# Patient Record
Sex: FEMALE | Race: BLACK OR AFRICAN AMERICAN | NOT HISPANIC OR LATINO
[De-identification: names, ages, dates, MRNs, and addresses within clinical notes are randomized per-mention and may not be internally consistent; named-entity substitution may affect disease eponyms.]

---

## 2017-03-28 PROBLEM — Z00.00 ENCOUNTER FOR PREVENTIVE HEALTH EXAMINATION: Status: ACTIVE | Noted: 2017-03-28

## 2017-04-12 ENCOUNTER — APPOINTMENT (OUTPATIENT)
Dept: OPHTHALMOLOGY | Facility: CLINIC | Age: 52
End: 2017-04-12

## 2017-04-12 DIAGNOSIS — H40.033 ANATOMICAL NARROW ANGLE, BILATERAL: ICD-10-CM

## 2020-01-02 ENCOUNTER — INPATIENT (INPATIENT)
Facility: HOSPITAL | Age: 55
LOS: 0 days | Discharge: HOME | End: 2020-01-03
Attending: SPECIALIST | Admitting: SPECIALIST
Payer: MEDICAID

## 2020-01-02 VITALS
SYSTOLIC BLOOD PRESSURE: 146 MMHG | RESPIRATION RATE: 16 BRPM | OXYGEN SATURATION: 100 % | TEMPERATURE: 98 F | DIASTOLIC BLOOD PRESSURE: 89 MMHG | HEART RATE: 86 BPM

## 2020-01-02 DIAGNOSIS — K56.609 UNSPECIFIED INTESTINAL OBSTRUCTION, UNSPECIFIED AS TO PARTIAL VERSUS COMPLETE OBSTRUCTION: ICD-10-CM

## 2020-01-02 LAB
ALBUMIN SERPL ELPH-MCNC: 4.3 G/DL — SIGNIFICANT CHANGE UP (ref 3.3–5)
ALP SERPL-CCNC: 122 U/L — HIGH (ref 40–120)
ALT FLD-CCNC: 14 U/L — SIGNIFICANT CHANGE UP (ref 4–33)
ANION GAP SERPL CALC-SCNC: 13 MMO/L — SIGNIFICANT CHANGE UP (ref 7–14)
AST SERPL-CCNC: 18 U/L — SIGNIFICANT CHANGE UP (ref 4–32)
BASE EXCESS BLDV CALC-SCNC: 2.6 MMOL/L — SIGNIFICANT CHANGE UP
BILIRUB SERPL-MCNC: 0.3 MG/DL — SIGNIFICANT CHANGE UP (ref 0.2–1.2)
BLOOD GAS VENOUS - CREATININE: 0.7 MG/DL — SIGNIFICANT CHANGE UP (ref 0.5–1.3)
BLOOD GAS VENOUS - FIO2: 21 — SIGNIFICANT CHANGE UP
BUN SERPL-MCNC: 15 MG/DL — SIGNIFICANT CHANGE UP (ref 7–23)
CALCIUM SERPL-MCNC: 9.1 MG/DL — SIGNIFICANT CHANGE UP (ref 8.4–10.5)
CHLORIDE BLDV-SCNC: 108 MMOL/L — SIGNIFICANT CHANGE UP (ref 96–108)
CHLORIDE SERPL-SCNC: 99 MMOL/L — SIGNIFICANT CHANGE UP (ref 98–107)
CO2 SERPL-SCNC: 23 MMOL/L — SIGNIFICANT CHANGE UP (ref 22–31)
CREAT SERPL-MCNC: 0.67 MG/DL — SIGNIFICANT CHANGE UP (ref 0.5–1.3)
GAS PNL BLDV: 139 MMOL/L — SIGNIFICANT CHANGE UP (ref 136–146)
GLUCOSE BLDV-MCNC: 103 MG/DL — HIGH (ref 70–99)
GLUCOSE SERPL-MCNC: 126 MG/DL — HIGH (ref 70–99)
HCO3 BLDV-SCNC: 25 MMOL/L — SIGNIFICANT CHANGE UP (ref 20–27)
HCT VFR BLD CALC: 43.6 % — SIGNIFICANT CHANGE UP (ref 34.5–45)
HCT VFR BLDV CALC: 43.9 % — SIGNIFICANT CHANGE UP (ref 34.5–45)
HGB BLD-MCNC: 14 G/DL — SIGNIFICANT CHANGE UP (ref 11.5–15.5)
HGB BLDV-MCNC: 14.3 G/DL — SIGNIFICANT CHANGE UP (ref 11.5–15.5)
LACTATE BLDV-MCNC: 1.1 MMOL/L — SIGNIFICANT CHANGE UP (ref 0.5–2)
MCHC RBC-ENTMCNC: 28 PG — SIGNIFICANT CHANGE UP (ref 27–34)
MCHC RBC-ENTMCNC: 32.1 % — SIGNIFICANT CHANGE UP (ref 32–36)
MCV RBC AUTO: 87.2 FL — SIGNIFICANT CHANGE UP (ref 80–100)
NRBC # FLD: 0 K/UL — SIGNIFICANT CHANGE UP (ref 0–0)
PCO2 BLDV: 47 MMHG — SIGNIFICANT CHANGE UP (ref 41–51)
PH BLDV: 7.38 PH — SIGNIFICANT CHANGE UP (ref 7.32–7.43)
PLATELET # BLD AUTO: 334 K/UL — SIGNIFICANT CHANGE UP (ref 150–400)
PMV BLD: 7.9 FL — SIGNIFICANT CHANGE UP (ref 7–13)
PO2 BLDV: 27 MMHG — LOW (ref 35–40)
POTASSIUM BLDV-SCNC: 3.7 MMOL/L — SIGNIFICANT CHANGE UP (ref 3.4–4.5)
POTASSIUM SERPL-MCNC: 3.8 MMOL/L — SIGNIFICANT CHANGE UP (ref 3.5–5.3)
POTASSIUM SERPL-SCNC: 3.8 MMOL/L — SIGNIFICANT CHANGE UP (ref 3.5–5.3)
PROT SERPL-MCNC: 7.9 G/DL — SIGNIFICANT CHANGE UP (ref 6–8.3)
RBC # BLD: 5 M/UL — SIGNIFICANT CHANGE UP (ref 3.8–5.2)
RBC # FLD: 12.6 % — SIGNIFICANT CHANGE UP (ref 10.3–14.5)
SAO2 % BLDV: 45.2 % — LOW (ref 60–85)
SODIUM SERPL-SCNC: 135 MMOL/L — SIGNIFICANT CHANGE UP (ref 135–145)
WBC # BLD: 13.12 K/UL — HIGH (ref 3.8–10.5)
WBC # FLD AUTO: 13.12 K/UL — HIGH (ref 3.8–10.5)

## 2020-01-02 PROCEDURE — 74177 CT ABD & PELVIS W/CONTRAST: CPT | Mod: 26

## 2020-01-02 PROCEDURE — 76705 ECHO EXAM OF ABDOMEN: CPT | Mod: 26

## 2020-01-02 RX ORDER — MORPHINE SULFATE 50 MG/1
4 CAPSULE, EXTENDED RELEASE ORAL ONCE
Refills: 0 | Status: DISCONTINUED | OUTPATIENT
Start: 2020-01-02 | End: 2020-01-02

## 2020-01-02 RX ORDER — ONDANSETRON 8 MG/1
4 TABLET, FILM COATED ORAL ONCE
Refills: 0 | Status: COMPLETED | OUTPATIENT
Start: 2020-01-02 | End: 2020-01-02

## 2020-01-02 RX ORDER — ENOXAPARIN SODIUM 100 MG/ML
40 INJECTION SUBCUTANEOUS ONCE
Refills: 0 | Status: COMPLETED | OUTPATIENT
Start: 2020-01-02 | End: 2020-01-02

## 2020-01-02 RX ORDER — SODIUM CHLORIDE 9 MG/ML
1000 INJECTION, SOLUTION INTRAVENOUS
Refills: 0 | Status: DISCONTINUED | OUTPATIENT
Start: 2020-01-02 | End: 2020-01-03

## 2020-01-02 RX ORDER — SODIUM CHLORIDE 9 MG/ML
1000 INJECTION INTRAMUSCULAR; INTRAVENOUS; SUBCUTANEOUS ONCE
Refills: 0 | Status: COMPLETED | OUTPATIENT
Start: 2020-01-02 | End: 2020-01-02

## 2020-01-02 RX ORDER — ENOXAPARIN SODIUM 100 MG/ML
40 INJECTION SUBCUTANEOUS EVERY 24 HOURS
Refills: 0 | Status: DISCONTINUED | OUTPATIENT
Start: 2020-01-02 | End: 2020-01-03

## 2020-01-02 RX ADMIN — SODIUM CHLORIDE 1000 MILLILITER(S): 9 INJECTION INTRAMUSCULAR; INTRAVENOUS; SUBCUTANEOUS at 09:43

## 2020-01-02 RX ADMIN — MORPHINE SULFATE 4 MILLIGRAM(S): 50 CAPSULE, EXTENDED RELEASE ORAL at 10:00

## 2020-01-02 RX ADMIN — SODIUM CHLORIDE 100 MILLILITER(S): 9 INJECTION, SOLUTION INTRAVENOUS at 18:41

## 2020-01-02 RX ADMIN — ONDANSETRON 4 MILLIGRAM(S): 8 TABLET, FILM COATED ORAL at 08:36

## 2020-01-02 RX ADMIN — ENOXAPARIN SODIUM 40 MILLIGRAM(S): 100 INJECTION SUBCUTANEOUS at 18:41

## 2020-01-02 NOTE — H&P ADULT - NSHPPHYSICALEXAM_GEN_ALL_CORE
Physical Exam  T(C): 36.7  HR: 67 (67 - 86)  BP: 137/77 (137/77 - 158/95)  RR: 18 (16 - 18)  SpO2: 100% (100% - 100%)  Tmax: T(C): , Max: 36.9 (01-02-20 @ 07:13)    General: well developed, well nourished, NAD  Neuro: alert and oriented x4  Respiratory: airway patent, respirations nonlabored on RA  CVS: regular rate and rhythm  Abdomen: soft, nontender, nondistended, prior infraumbilical incision is well healed, prior Pfannenstiel incision is well-healed  Extremities: no edema, sensation and movement grossly intact  Skin: warm, dry, appropriate color

## 2020-01-02 NOTE — ED PROVIDER NOTE - NS ED ROS FT
Gen: + fever, denies weight loss  CV: Denies chest pain, palpitations  HEENT: Denies neck pain, sore throat, eye discharge  Skin: Denies rash, erythema, color changes  Resp: Denies SOB, cough  Endo: Denies sensitivity to heat, cold, increased urination  GI: Denies constipation, + nausea, + vomiting  Msk: Denies back pain, LE swelling, extremity pain  : Denies dysuria, increased frequency  Neuro: Denies LOC, weakness, seizures  Psych: Denies hx of psych, hallucinations, SI

## 2020-01-02 NOTE — ED PROVIDER NOTE - CLINICAL SUMMARY MEDICAL DECISION MAKING FREE TEXT BOX
Joseph Frankel PGY1: 53 yo with no pmh presenting for abdominal pain and n/v. VSS. Patient looks well and is non toxic appearing. PE as above. Concern for cholecystitis vs appendicitis. Also could be viral gastroenteritis. Will get basic labs, RUQ US, CT AandP, give fluids and zofran. Will reassess. Joseph Frankel PGY1: 55 yo with no pmh presenting for abdominal pain and n/v. VSS. Patient looks well and is non toxic appearing. PE as above. Concern for cholecystitis vs appendicitis vs sbo. Also could be viral gastroenteritis. Will get basic labs, RUQ US, CT AandP, give fluids and zofran. Will reassess.

## 2020-01-02 NOTE — ED PROVIDER NOTE - ATTENDING CONTRIBUTION TO CARE
Dr. Hobbs: 55 yo female with no sig PMH in ED with 1 day of abdominal pain, possibly beginning after eating leftover food at home.  Pain worse in lower abdominal quadrants, accompanied by V/D.  No CP/SOB.  On exam pt uncomfortable appearing, in mild distress due to pain, heart RRR, lungs CTAB, abd TTP RUQ and RLQ, extremities without swelling, strength 5/5 in all extremities and skin without rash.

## 2020-01-02 NOTE — ED ADULT NURSE NOTE - OBJECTIVE STATEMENT
patient Alert & ox3. patient complains of abdominal pain, nausea&vomitting .   pt . evaluated by MD. Placed 20g angiocath rt. AC., labs drawn and sent. Patient is comfortable , patient will be waiting for results, further evaluation and disposition.  made comfortable.will continue to monitor.

## 2020-01-02 NOTE — ED ADULT TRIAGE NOTE - CHIEF COMPLAINT QUOTE
pt arrives w/ c/o abdominal pain, nausea, vomiting and diarrhea. pt states she thinks she has food poisoning but not getting better.

## 2020-01-02 NOTE — H&P ADULT - HISTORY OF PRESENT ILLNESS
GENERAL SURGERY H&P    HPI:  54F no PMHx presenting to the ED with 1 day of abdominal pain, n/v. She states the abdominal pain started yesterday evening as a cramping feeling throughout her abdomen. She then developed nausea and vomiting, vomiting approximately 5 times. This was similar to a prior bout of food poisoning so she did not come to the ED at that time. In the past, she has had these symptoms attributable to a certain food, but this time she cannot identify a food that she ate. She had eaten the same as her family who has had no symptoms. The pain persisted and she later developed diarrhea overnight last night. The abdominal pain was in the LLQ and she last felt it when she first arrived in the ED, currently has no abdominal pain or nausea. She also reports having had significant bloating last night into this morning that has since resolved with the bouts of diarrhea. She states that she has been passing flatus with the diarrhea as well. She felt feverish yesterday but did not measure her temperature. Denies urinary symptoms. Denies chest pain or SOB. She had endometriosis that was protrusive through her umbilical and had the endometrioma removed several years ago, she then had a D&C followed by a hysterectomy in 2009. Otherwise, no other abdominal surgeries.      PMHx: Endometriosis  PSHx: D&C, Hysterectomy  Medications (inpatient):   Medications (PRN):  Allergies: penicillin (Hives)  (Intolerances: )  Social Hx: Lives at home with her , nonsmoker  Family Hx: No pertinent family hx

## 2020-01-02 NOTE — H&P ADULT - ATTENDING COMMENTS
History obtained, the patient was examined.  Differential diagnosis: Enteritis vs. small bowel obstruction. patient can't identify any specific food that might have caused this episode.  She is the only family member that fell ill.  Plan: NPO, no analgesics, close monitoring.

## 2020-01-02 NOTE — ED PROVIDER NOTE - PHYSICAL EXAMINATION
Gen: Alert and oriented. Lying comfortably in bed. Answering questions appropriately  HEENT: extra occular movements intact, no nasal discharge, mucous membranes moist  CV: Regular rate and rhythm, +S1/S2, no murmurs/rubs/gallops,   Resp: Clear to ausculation bilaterally, no wheezes/rhonchi/rales  GI: Abdomen soft non-distended, tender in RUQ and RLQ. No rebound  MSK: No open wounds, no bruising, no LE edema, Homans sign negative bl  Neuro: A&Ox4, following commands, moving all four extremities spontaneously  Psych: appropriate mood

## 2020-01-02 NOTE — ED PROVIDER NOTE - OBJECTIVE STATEMENT
55 yo f with no pmh who presents with 1 day of abdominal pain, n/v. States she believes symptoms might be secondary to food poisioning. Abdominal pain is in LLQ which is crampy in nature and can be severe at times. Vomiting and diahrea relieve her symptoms. Nothing exacerbates. Not radiating. Has had 5 episodes of nbnb vomiting and many episodes of non bloody diarrhea. Feels hot and endorses chills. Denies urinary symptoms. Denies chest pain or SOB.

## 2020-01-02 NOTE — H&P ADULT - ASSESSMENT
ASSESSMENT  54F no PMHx presenting to the ED with 1 day of abdominal pain, nausea and vomiting, and diarrhea. Normal vital signs. Leukocytosis to 13.  Imaging showing mural thickening of the small bowel loops in the RLQ, a few prominent loops of small bowel in the RLQ including a fecalized loop, which may be partially obstructing    PLAN  - Admit to B team surgery under Dr. Arana  - NPO IVF  - Serial abdominal exams  - Discussed with Dr. Arana    B Team Surgery  [id number]

## 2020-01-02 NOTE — CONSULT NOTE ADULT - ASSESSMENT
ASSESSMENT  54F no PMHx presenting to the ED with 1 day of abdominal pain, nausea and vomiting, and diarrhea. Normal vital signs. Leukocytosis to 13.  Imaging showing mural thickening of the small bowel loops in the RLQ, a few prominent loops of small bowel in the RLQ including a fecalized loop, which may be partially obstructing  Given that the patient is passing flatus and stool, and her abdominal distension and nausea have resolved, unlikely to have small bowel obstruction    PLAN  - Clinical picture inconsistent with bowel obstruction  - To be discussed with Dr. Arana

## 2020-01-02 NOTE — CONSULT NOTE ADULT - SUBJECTIVE AND OBJECTIVE BOX
GENERAL SURGERY CONSULT NOTE    HPI:  54F no PMHx presenting to the ED with 1 day of abdominal pain, n/v. She states the abdominal pain started yesterday evening as a cramping feeling throughout her abdomen. She then developed nausea and vomiting, vomiting approximately 5 times. This was similar to a prior bout of food poisioning so she did not come to the ED at that time. The pain persisted and she later developed diarrhea overnight last night. The abdominal pain was in the LLQ and she last felt it when she first arrived in the ED, currently has no abdominal pain or nausea. She also reports having had significant bloating last night into this morning that has since resolved with the bouts of diarrhea. She states that she has been passing flatus with the diarrhea as well. She felt feverish yesterday but did not measure her temperature. Denies urinary symptoms. Denies chest pain or SOB. She had endometriosis that was protrusive through her umbilical and had the endometrioma removed several years ago, she then had a D&C followed by a hysterectomy in 2009. Otherwise, no other abdominal surgeries.    PMHx: Endometriosis  PSHx: D&C, Hysterectomy  Medications (inpatient):   Medications (PRN):  Allergies: penicillin (Hives)  Social Hx: Lives at home with her , nonsmoker  Family Hx: No pertinent family hx    Physical Exam  T(C): 36.7  HR: 68 (68 - 86)  BP: 139/87 (139/87 - 158/95)  RR: 16 (16 - 16)  SpO2: 100% (100% - 100%)  Tmax: T(C): , Max: 36.9 (01-02-20 @ 07:13)    General: well developed, well nourished, NAD  Neuro: alert and oriented x4  Respiratory: airway patent, respirations nonlabored on RA  CVS: regular rate and rhythm  Abdomen: soft, nontender, nondistended, prior infraumbilical incision is well healed, prior Pfannenstiel incision is well-healed  Extremities: no edema, sensation and movement grossly intact  Skin: warm, dry, appropriate color    Labs:                        14.0   13.12 )-----------( 334      ( 02 Jan 2020 08:33 )             43.6       01-02    135  |  99  |  15  ----------------------------<  126<H>  3.8   |  23  |  0.67    Ca    9.1      02 Jan 2020 08:33    TPro  7.9  /  Alb  4.3  /  TBili  0.3  /  DBili  x   /  AST  18  /  ALT  14  /  AlkPhos  122<H>  01-02      Imaging and other studies:  < from: CT Abdomen and Pelvis w/ IV Cont (01.02.20 @ 10:45) >  FINDINGS:    LOWER CHEST: Calcified granulomas.    LIVER: Steatosis.  BILE DUCTS: Normal caliber.  GALLBLADDER: Within normal limits.  SPLEEN: Within normal limits.  PANCREAS: Within normal limits.  ADRENALS: Within normal limits.  KIDNEYS/URETERS: Within normal limits.    BLADDER: Decompressed.  REPRODUCTIVE ORGANS: Hysterectomy. No adnexal mass.    BOWEL: Colonic diverticulosis.Appendix is normal. There is mural thickening of the small bowel loops in the right lower quadrant with adjacent fatty stranding and small free fluid in the pelvis. The mesenteric vasculature is patent. A few prominent loops of small bowel in the right lower quadrant including a fecalized loop, which is located adjacent to the anterior abdominal wall and may be partially obstructing (2, 87). Terminal ileum is within normal limits.    PERITONEUM: Trace free fluid.  VESSELS: Within normal limits.  RETROPERITONEUM/LYMPH NODES: No lymphadenopathy.    ABDOMINAL WALL: Within normal limits.  BONES: Within normal limits.    IMPRESSION:     Enteritis in the right lower quadrant with question of partial small bowel obstruction. This may be of ischemic or infectious etiology.

## 2020-01-02 NOTE — H&P ADULT - NSHPLABSRESULTS_GEN_ALL_CORE
Labs:                        14.0   13.12 )-----------( 334      ( 02 Jan 2020 08:33 )             43.6       01-02    135  |  99  |  15  ----------------------------<  126<H>  3.8   |  23  |  0.67    Ca    9.1      02 Jan 2020 08:33    TPro  7.9  /  Alb  4.3  /  TBili  0.3  /  DBili  x   /  AST  18  /  ALT  14  /  AlkPhos  122<H>  01-02    Imaging and other studies:    Imaging and other studies:  < from: CT Abdomen and Pelvis w/ IV Cont (01.02.20 @ 10:45) >  FINDINGS:    LOWER CHEST: Calcified granulomas.    LIVER: Steatosis.  BILE DUCTS: Normal caliber.  GALLBLADDER: Within normal limits.  SPLEEN: Within normal limits.  PANCREAS: Within normal limits.  ADRENALS: Within normal limits.  KIDNEYS/URETERS: Within normal limits.    BLADDER: Decompressed.  REPRODUCTIVE ORGANS: Hysterectomy. No adnexal mass.    BOWEL: Colonic diverticulosis.Appendix is normal. There is mural thickening of the small bowel loops in the right lower quadrant with adjacent fatty stranding and small free fluid in the pelvis. The mesenteric vasculature is patent. A few prominent loops of small bowel in the right lower quadrant including a fecalized loop, which is located adjacent to the anterior abdominal wall and may be partially obstructing (2, 87). Terminal ileum is within normal limits.    PERITONEUM: Trace free fluid.  VESSELS: Within normal limits.  RETROPERITONEUM/LYMPH NODES: No lymphadenopathy.    ABDOMINAL WALL: Within normal limits.  BONES: Within normal limits.    IMPRESSION:     Enteritis in the right lower quadrant with question of partial small bowel obstruction. This may be of ischemic or infectious etiology.

## 2020-01-03 ENCOUNTER — TRANSCRIPTION ENCOUNTER (OUTPATIENT)
Age: 55
End: 2020-01-03

## 2020-01-03 VITALS
HEART RATE: 70 BPM | RESPIRATION RATE: 18 BRPM | TEMPERATURE: 98 F | OXYGEN SATURATION: 95 % | SYSTOLIC BLOOD PRESSURE: 122 MMHG | DIASTOLIC BLOOD PRESSURE: 74 MMHG

## 2020-01-03 LAB
ANION GAP SERPL CALC-SCNC: 11 MMO/L — SIGNIFICANT CHANGE UP (ref 7–14)
BUN SERPL-MCNC: 13 MG/DL — SIGNIFICANT CHANGE UP (ref 7–23)
CALCIUM SERPL-MCNC: 8.8 MG/DL — SIGNIFICANT CHANGE UP (ref 8.4–10.5)
CHLORIDE SERPL-SCNC: 102 MMOL/L — SIGNIFICANT CHANGE UP (ref 98–107)
CO2 SERPL-SCNC: 27 MMOL/L — SIGNIFICANT CHANGE UP (ref 22–31)
CREAT SERPL-MCNC: 0.89 MG/DL — SIGNIFICANT CHANGE UP (ref 0.5–1.3)
GLUCOSE SERPL-MCNC: 89 MG/DL — SIGNIFICANT CHANGE UP (ref 70–99)
HCT VFR BLD CALC: 41.6 % — SIGNIFICANT CHANGE UP (ref 34.5–45)
HGB BLD-MCNC: 13.4 G/DL — SIGNIFICANT CHANGE UP (ref 11.5–15.5)
MAGNESIUM SERPL-MCNC: 2.1 MG/DL — SIGNIFICANT CHANGE UP (ref 1.6–2.6)
MCHC RBC-ENTMCNC: 28.3 PG — SIGNIFICANT CHANGE UP (ref 27–34)
MCHC RBC-ENTMCNC: 32.2 % — SIGNIFICANT CHANGE UP (ref 32–36)
MCV RBC AUTO: 87.9 FL — SIGNIFICANT CHANGE UP (ref 80–100)
NRBC # FLD: 0 K/UL — SIGNIFICANT CHANGE UP (ref 0–0)
PHOSPHATE SERPL-MCNC: 2.9 MG/DL — SIGNIFICANT CHANGE UP (ref 2.5–4.5)
PLATELET # BLD AUTO: 320 K/UL — SIGNIFICANT CHANGE UP (ref 150–400)
PMV BLD: 8.1 FL — SIGNIFICANT CHANGE UP (ref 7–13)
POTASSIUM SERPL-MCNC: 3.6 MMOL/L — SIGNIFICANT CHANGE UP (ref 3.5–5.3)
POTASSIUM SERPL-SCNC: 3.6 MMOL/L — SIGNIFICANT CHANGE UP (ref 3.5–5.3)
RBC # BLD: 4.73 M/UL — SIGNIFICANT CHANGE UP (ref 3.8–5.2)
RBC # FLD: 12.8 % — SIGNIFICANT CHANGE UP (ref 10.3–14.5)
SODIUM SERPL-SCNC: 140 MMOL/L — SIGNIFICANT CHANGE UP (ref 135–145)
WBC # BLD: 9.21 K/UL — SIGNIFICANT CHANGE UP (ref 3.8–10.5)
WBC # FLD AUTO: 9.21 K/UL — SIGNIFICANT CHANGE UP (ref 3.8–10.5)

## 2020-01-03 PROCEDURE — 99238 HOSP IP/OBS DSCHRG MGMT 30/<: CPT

## 2020-01-03 RX ORDER — FLUTICASONE PROPIONATE 50 MCG
1 SPRAY, SUSPENSION NASAL
Qty: 0 | Refills: 0 | DISCHARGE
Start: 2020-01-03

## 2020-01-03 RX ORDER — ACETAMINOPHEN 500 MG
650 TABLET ORAL EVERY 6 HOURS
Refills: 0 | Status: DISCONTINUED | OUTPATIENT
Start: 2020-01-03 | End: 2020-01-03

## 2020-01-03 RX ORDER — POTASSIUM PHOSPHATE, MONOBASIC POTASSIUM PHOSPHATE, DIBASIC 236; 224 MG/ML; MG/ML
15 INJECTION, SOLUTION INTRAVENOUS ONCE
Refills: 0 | Status: COMPLETED | OUTPATIENT
Start: 2020-01-03 | End: 2020-01-03

## 2020-01-03 RX ORDER — FLUTICASONE PROPIONATE 50 MCG
1 SPRAY, SUSPENSION NASAL ONCE
Refills: 0 | Status: COMPLETED | OUTPATIENT
Start: 2020-01-03 | End: 2020-01-03

## 2020-01-03 RX ADMIN — Medication 1 SPRAY(S): at 16:23

## 2020-01-03 RX ADMIN — POTASSIUM PHOSPHATE, MONOBASIC POTASSIUM PHOSPHATE, DIBASIC 62.5 MILLIMOLE(S): 236; 224 INJECTION, SOLUTION INTRAVENOUS at 12:09

## 2020-01-03 RX ADMIN — Medication 650 MILLIGRAM(S): at 14:23

## 2020-01-03 RX ADMIN — Medication 650 MILLIGRAM(S): at 13:53

## 2020-01-03 NOTE — DISCHARGE NOTE NURSING/CASE MANAGEMENT/SOCIAL WORK - PATIENT PORTAL LINK FT
You can access the FollowMyHealth Patient Portal offered by [facility] by registering at the following website: [email or web address]. By joining [employer]’s FollowMyHealth portal, you will also be able to view your health information using other applications (apps) compatible with our system.

## 2020-01-03 NOTE — PROGRESS NOTE ADULT - SUBJECTIVE AND OBJECTIVE BOX
Morning Surgical Progress Note  Patient is a 54y old  Female who presents with a chief complaint of Abd pain (02 Jan 2020 15:26)      SUBJECTIVE: Patient seen and examined at bedside with surgical team, patient without complaints. There were no acute events overnight. Patient is passing flatus.    Vital Signs Last 24 Hrs  T(C): 36.7 (03 Jan 2020 05:47), Max: 37.2 (03 Jan 2020 01:52)  T(F): 98.1 (03 Jan 2020 05:47), Max: 99 (03 Jan 2020 01:52)  HR: 68 (03 Jan 2020 05:47) (66 - 84)  BP: 137/81 (03 Jan 2020 05:47) (117/70 - 158/95)  BP(mean): --  RR: 17 (03 Jan 2020 05:47) (16 - 18)  SpO2: 97% (03 Jan 2020 05:47) (97% - 100%)I&O's Detail    02 Jan 2020 07:01  -  03 Jan 2020 07:00  --------------------------------------------------------  IN:    lactated ringers.: 1100 mL  Total IN: 1100 mL    OUT:    Voided: 850 mL  Total OUT: 850 mL    Total NET: 250 mL      MEDICATIONS  (STANDING):  enoxaparin Injectable 40 milliGRAM(s) SubCutaneous every 24 hours  lactated ringers. 1000 milliLiter(s) (100 mL/Hr) IV Continuous <Continuous>  potassium phosphate IVPB 15 milliMole(s) IV Intermittent once    MEDICATIONS  (PRN):      Physical Exam  Constitutional: A&Ox3, NAD  Respiratory: CTA b/l  Gastrointestinal: abdomen soft, NT/ND    LABS:                        13.4   9.21  )-----------( 320      ( 03 Jan 2020 06:53 )             41.6     01-03    140  |  102  |  13  ----------------------------<  89  3.6   |  27  |  0.89    Ca    8.8      03 Jan 2020 06:53  Phos  2.9     01-03  Mg     2.1     01-03    TPro  7.9  /  Alb  4.3  /  TBili  0.3  /  DBili  x   /  AST  18  /  ALT  14  /  AlkPhos  122<H>  01-02      LIVER FUNCTIONS - ( 02 Jan 2020 08:33 )  Alb: 4.3 g/dL / Pro: 7.9 g/dL / ALK PHOS: 122 u/L / ALT: 14 u/L / AST: 18 u/L / GGT: x

## 2020-01-03 NOTE — DISCHARGE NOTE PROVIDER - NSDCCPCAREPLAN_GEN_ALL_CORE_FT
PRINCIPAL DISCHARGE DIAGNOSIS  Diagnosis: Small bowel obstruction  Assessment and Plan of Treatment: ACTIVITY: You may return to your usual level of physical activity. If you are taking narcotic pain medication DO NOT drive a car, operate machinery or make important decisions.  DIET: Return to your usual diet.  NOTIFY YOUR DOCTOR IF YOU HAVE: inability to pass gas or have bowel movements, any fever (over 100.4 F), persistent nausea/vomiting, severe abdominal pain, or inability to urinate.  Please follow up with your primary care physician in one week regarding your hospitalization, bring copies of your discharge paperwork.  Please follow up with your surgeon, Dr. Arana. Call to make an appointment.

## 2020-01-03 NOTE — PROGRESS NOTE ADULT - ATTENDING COMMENTS
Above noted. Feels better, denies abdominal pain, passing flatus, no bowel movement, tolerating a liquid diet.  Physical Exam: Afebrile.  Abdomen: Soft, non-distended, nontender.  Plan: Advance to a regular diet. If she tolerates it, and remains asymptomatic, may be discharged tonight.

## 2020-01-03 NOTE — DISCHARGE NOTE PROVIDER - HOSPITAL COURSE
This patient is a 54 year old female no PMHx who presented to the ED on 1/2/20 with 1 day of abdominal pain, n/v. She states the abdominal pain started yesterday evening as a cramping feeling throughout her abdomen. She then developed nausea and vomiting, vomiting approximately 5 times. This was similar to a prior bout of food poisoning so she did not come to the ED at that time. In the past, she has had these symptoms attributable to a certain food, but this time she cannot identify a food that she ate. She had eaten the same as her family who has had no symptoms. The pain persisted and she later developed diarrhea overnight last night. The abdominal pain was in the LLQ and she last felt it when she first arrived in the ED, currently has no abdominal pain or nausea. She also reports having had significant bloating last night into this morning that has since resolved with the bouts of diarrhea. She states that she has been passing flatus with the diarrhea as well. She felt feverish yesterday but did not measure her temperature. Denies urinary symptoms. Denies chest pain or SOB. She had endometriosis that was protrusive through her umbilical and had the endometrioma removed several years ago, she then had a D&C followed by a hysterectomy in 2009. Otherwise, no other abdominal surgeries.        In the ED, patient had a CT abdomen and pelvix with IV contrast which revealed enteritis in the right lower quadrant with question of partial small bowel obstruction. This may be of ischemic or infectious etiology. Patient was admitted to general surgery under the care of Dr. Arana. Patient was made NPO, put on IVF, and was monitored closely with serial abdominal exams. By 1/3/20, patient had return of bowel function and was advanced to clear liquid diet which she tolerated well. By lunchtime on 1/3/20, patient's diet was advanced to regular which she tolerated well.  At this time, patient is currently ambulating, voiding, tolerating a regular diet and having bowel function.********Patient has been deemed stable for discharge home with follow up as an outpatient. This patient is a 54 year old female no PMHx who presented to the ED on 1/2/20 with 1 day of abdominal pain, n/v. She states the abdominal pain started yesterday evening as a cramping feeling throughout her abdomen. She then developed nausea and vomiting, vomiting approximately 5 times. This was similar to a prior bout of food poisoning so she did not come to the ED at that time. In the past, she has had these symptoms attributable to a certain food, but this time she cannot identify a food that she ate. She had eaten the same as her family who has had no symptoms. The pain persisted and she later developed diarrhea overnight last night. The abdominal pain was in the LLQ and she last felt it when she first arrived in the ED, currently has no abdominal pain or nausea. She also reports having had significant bloating last night into this morning that has since resolved with the bouts of diarrhea. She states that she has been passing flatus with the diarrhea as well. She felt feverish yesterday but did not measure her temperature. Denies urinary symptoms. Denies chest pain or SOB. She had endometriosis that was protrusive through her umbilical and had the endometrioma removed several years ago, she then had a D&C followed by a hysterectomy in 2009. Otherwise, no other abdominal surgeries.        In the ED, patient had a CT abdomen and pelvix with IV contrast which revealed enteritis in the right lower quadrant with question of partial small bowel obstruction. This may be of ischemic or infectious etiology. Patient was admitted to general surgery under the care of Dr. Arana. Patient was made NPO, put on IVF, and was monitored closely with serial abdominal exams. By 1/3/20, patient had return of bowel function and was advanced to clear liquid diet which she tolerated well. By lunchtime on 1/3/20, patient's diet was advanced to regular which she tolerated well.  At this time, patient is currently ambulating, voiding, tolerating a regular diet and having bowel function. Patient has been deemed stable for discharge home with follow up as an outpatient.

## 2020-01-03 NOTE — DISCHARGE NOTE PROVIDER - CARE PROVIDER_API CALL
Ja Arana)  Surgery  [address], [address]  [place] [address]  Phone: [phone]  Fax: [phone]  Follow Up Time: 1 week

## 2020-01-03 NOTE — PROGRESS NOTE ADULT - ASSESSMENT
55yo F with partial bowel obstruction v. enteritis. Patient states that her abdominal pain and nausea have resolved.     Plan:  CLD  Advance to regular diet for lunch if breakfast tolerated  Discharge to home if tolerates regular diet for lunch    B Team.

## 2020-01-03 NOTE — DISCHARGE NOTE NURSING/CASE MANAGEMENT/SOCIAL WORK - NSDCPNINST_GEN_ALL_CORE
Watch for signs of infection; redness, swelling, fever, chills or heat, report such symptoms to the MD. No driving while taking pain medication, it causes drowsiness & constipation. Drink 6-8 glasses of fluids daily to promote hydration. No heavy lifting, pulling or pushing heavy objects, you may return to your normal activity levels. Follow up with the MD with per discharge orders

## 2023-08-31 NOTE — PATIENT PROFILE ADULT - LIVES WITH
Emergency Department Provider Note       PCP: On File Not (Inactive)   Age: 30 y.o. Sex: male     [address] 08/31/2023 06:16:27 PM       ICD-10-CM    1. Verbalizes suicidal thoughts  R45.851       2. Delusions ([address])  F22       3. Methamphetamine abuse ([address])  F15.10       4. Marijuana abuse  F12.10           Medical Decision Making     Complexity of Problems Addressed:  1 or more chronic illnesses with a severe exacerbation or progression. Data Reviewed and Analyzed:   I independently ordered and reviewed each unique test.  I reviewed external records: ED visit note from an outside group. Discussion of management or test interpretation. Patient has multiple visits in the past few days. He was most recently seen at [facility] yesterday. He has been diagnosed with malingering and antisocial personality disorder. They deemed him safe to discharge and thought his reason for presentation was secondary gain due to homelessness. He does have scars from previous cutting behavior and reports several admissions in the past.  When given several options such as help getting started on meds or follow-up appointments he persistently states that he is suicidal.  He is agreeable to staying overnight to see the psych nurse practitioner in the morning. He was given Zydis. He is not being put on papers as he has been evaluated multiple times over the past few days and determined to be malingering. I spoke with the nurse practitioner and let her know that I did not think that the patient needed to be on psych papers but that I wanted him evaluated in the morning to help him with resources for housing and medication and follow-up with psych. He is agreeable. His UDS is positive for meth. The management of this patient was discussed with an external consultant. Risk of Complications and/or Morbidity of Patient Management:  Prescription drug management performed.   Discussion with and grammatical and other typographical errors may be present. This note has not been completely proofread for errors.      Elina Kelly MD  09/01/23 [id number] adult child(arline)/spouse

## 2024-01-11 ENCOUNTER — APPOINTMENT (OUTPATIENT)
Dept: UROLOGY | Facility: CLINIC | Age: 59
End: 2024-01-11

## 2024-01-31 ENCOUNTER — LABORATORY RESULT (OUTPATIENT)
Age: 59
End: 2024-01-31

## 2024-01-31 ENCOUNTER — APPOINTMENT (OUTPATIENT)
Dept: UROLOGY | Facility: CLINIC | Age: 59
End: 2024-01-31
Payer: MEDICAID

## 2024-01-31 VITALS
OXYGEN SATURATION: 98 % | DIASTOLIC BLOOD PRESSURE: 87 MMHG | HEIGHT: 66 IN | RESPIRATION RATE: 17 BRPM | HEART RATE: 77 BPM | SYSTOLIC BLOOD PRESSURE: 146 MMHG | WEIGHT: 200 LBS | BODY MASS INDEX: 32.14 KG/M2

## 2024-01-31 DIAGNOSIS — Z78.9 OTHER SPECIFIED HEALTH STATUS: ICD-10-CM

## 2024-01-31 DIAGNOSIS — Z83.3 FAMILY HISTORY OF DIABETES MELLITUS: ICD-10-CM

## 2024-01-31 DIAGNOSIS — N20.0 CALCULUS OF KIDNEY: ICD-10-CM

## 2024-01-31 DIAGNOSIS — Z87.442 PERSONAL HISTORY OF URINARY CALCULI: ICD-10-CM

## 2024-01-31 DIAGNOSIS — Z82.49 FAMILY HISTORY OF ISCHEMIC HEART DISEASE AND OTHER DISEASES OF THE CIRCULATORY SYSTEM: ICD-10-CM

## 2024-01-31 DIAGNOSIS — Z87.42 PERSONAL HISTORY OF OTHER DISEASES OF THE FEMALE GENITAL TRACT: ICD-10-CM

## 2024-01-31 LAB
APPEARANCE: CLEAR
BILIRUBIN URINE: NEGATIVE
BLOOD URINE: NEGATIVE
COLOR: YELLOW
GLUCOSE QUALITATIVE U: NEGATIVE MG/DL
KETONES URINE: NEGATIVE MG/DL
LEUKOCYTE ESTERASE URINE: ABNORMAL
NITRITE URINE: NEGATIVE
PH URINE: 6.5
PROTEIN URINE: NEGATIVE MG/DL
SPECIFIC GRAVITY URINE: 1.01
UROBILINOGEN URINE: 0.2 MG/DL

## 2024-01-31 PROCEDURE — 99204 OFFICE O/P NEW MOD 45 MIN: CPT

## 2024-01-31 RX ORDER — CALCIUM CARBONATE 260MG(650)
TABLET,CHEWABLE ORAL
Refills: 0 | Status: ACTIVE | COMMUNITY

## 2024-01-31 RX ORDER — VITAMIN B COMPLEX
CAPSULE ORAL
Refills: 0 | Status: ACTIVE | COMMUNITY

## 2024-01-31 RX ORDER — ZINC SULFATE 50(220)MG
CAPSULE ORAL
Refills: 0 | Status: ACTIVE | COMMUNITY

## 2024-01-31 RX ORDER — SENNOSIDES 8.6 MG
TABLET ORAL
Refills: 0 | Status: ACTIVE | COMMUNITY

## 2024-01-31 RX ORDER — MULTIVIT-MIN/IRON/FOLIC ACID/K 18-600-40
CAPSULE ORAL
Refills: 0 | Status: ACTIVE | COMMUNITY

## 2024-01-31 NOTE — LETTER BODY
[Dear  ___] : Dear  [unfilled], [Consult Letter:] : I had the pleasure of evaluating your patient, [unfilled]. [Please see my note below.] : Please see my note below. [Consult Closing:] : Thank you very much for allowing me to participate in the care of this patient.  If you have any questions, please do not hesitate to contact me. [FreeTextEntry1] : Please see my note. I will keep you informed. [FreeTextEntry3] : Sincerely,  Nichelle Zelaya MD Clinical  [facility]

## 2024-01-31 NOTE — ASSESSMENT
[FreeTextEntry1] : ASSESSMENT: Microscopic blood without infection  Creat 0.95 10/18/23  UA shows trace blood. RBC 20-40    PLAN: Urine sent for UA, Culture, and Cytology.  Will have CT abd and Pelvis Urogram w/wo IV Cont. She will also have a cystoscopy.    I have discussed at length the risks and benefits and rationale behind the procedure and the patient seems to understand and wants to proceed.   Follow up for cysto.   Also, had a conversation regarding vaginal atrophy, the blood on the TP, and vaginal dryness.Explained that it can be addressed with topical low dose hormone cream if she opts to try it.

## 2024-01-31 NOTE — ADDENDUM
[FreeTextEntry1] : This note was partly authored by Kevin Hyde working as a scribe for DREW Thomson. I, DREW Thomson, have reviewed the content of this note and confirm it is true and accurate. I personally performed the history and physical examination and made all the decisions. 01/31/2024.

## 2024-01-31 NOTE — HISTORY OF PRESENT ILLNESS
[FreeTextEntry1] : 2024: Ms. SILVA is a 59-year-old female  with h/o endometriosis treated with open hysterectomy and oophorectomy. All benign. Unsure if cervix was left intact.   Today she reports onset "pinkish red spotting after voiding" in the tissue. Denies blood in the toilet water/bowl.  Also reports pain with intercourse.  c/o urinary stress incontinence for which she uses a lining pad.   She was evluated and found with microhematuria in the past as well. -Creat 0.95 normal on 10/18/23  -UA shows trace blood. RBC 20-40 on 10/18/23 w a neg cx  She was never a smoker.  Reports one incidence of kidney stones.   Denies incidence of UTI as a child or adult for which she was hospitalized.  Denies fhx breast, uterine or ovarian cancer.  No fhx renal, bladder, and prostate cancer. No fhx of bleeding disorder.   shx of 1 .    No known allergy to selfish.

## 2024-01-31 NOTE — REVIEW OF SYSTEMS
[Feeling Poorly] : feeling poorly [Feeling Tired] : feeling tired [Eyesight Problems] : eyesight problems [Painful [place]] : painful [place] [Loss of interest] : loss of interest in sexual activity [Genital yeast infection] : genital yeast infection [Date of last menstrual period ____] : date of last menstrual period: [unfilled] [Presently in menopause ___] : presently in menopause [unfilled] [Other ___] : [unfilled] [Told you have blood in urine on a urine test] : told blood was present in a urine test [History of kidney stones] : history of kidney stones [Wake up at night to urinate  How many times?  ___] : wakes up to urinate [unfilled] times during the night [Leakage of urine with straining, coughing, laughing] : leakage of urine with straining, coughing, laughing [Joint Pain] : joint pain [Limb Swelling] : limb swelling [Skin Lesions] : skin lesion [Hot Flashes] : hot flashes [Easy Bruising] : a tendency for easy bruising [Negative] : ENT [Blood in urine that you can see] : blood visible in urine [Fever] : no fever [Chills] : no chills [Skin Wound] : no skin wound [Itching] : no itching [Anxiety] : no anxiety [Depression] : no depression [Muscle Weakness] : no muscle weakness [Feelings Of Weakness] : no feelings of weakness [Easy Bleeding] : no tendency for easy bleeding [Swollen Glands] : no swollen glands

## 2024-02-08 ENCOUNTER — APPOINTMENT (OUTPATIENT)
Dept: CT IMAGING | Facility: IMAGING CENTER | Age: 59
End: 2024-02-08
Payer: COMMERCIAL

## 2024-02-08 ENCOUNTER — OUTPATIENT (OUTPATIENT)
Dept: OUTPATIENT SERVICES | Facility: HOSPITAL | Age: 59
LOS: 1 days | End: 2024-02-08
Payer: COMMERCIAL

## 2024-02-08 DIAGNOSIS — R31.9 HEMATURIA, UNSPECIFIED: ICD-10-CM

## 2024-02-08 PROCEDURE — 74178 CT ABD&PLV WO CNTR FLWD CNTR: CPT

## 2024-02-08 PROCEDURE — 74178 CT ABD&PLV WO CNTR FLWD CNTR: CPT | Mod: 26

## 2024-02-15 ENCOUNTER — APPOINTMENT (OUTPATIENT)
Dept: UROLOGY | Facility: CLINIC | Age: 59
End: 2024-02-15
Payer: COMMERCIAL

## 2024-02-15 ENCOUNTER — LABORATORY RESULT (OUTPATIENT)
Age: 59
End: 2024-02-15

## 2024-02-15 ENCOUNTER — OUTPATIENT (OUTPATIENT)
Dept: OUTPATIENT SERVICES | Facility: HOSPITAL | Age: 59
LOS: 1 days | End: 2024-02-15
Payer: COMMERCIAL

## 2024-02-15 DIAGNOSIS — N95.2 POSTMENOPAUSAL ATROPHIC VAGINITIS: ICD-10-CM

## 2024-02-15 DIAGNOSIS — R89.6 ABNORMAL CYTOLOGICAL FINDINGS IN SPECIMENS FROM OTHER ORGANS, SYSTEMS AND TISSUES: ICD-10-CM

## 2024-02-15 DIAGNOSIS — R35.0 FREQUENCY OF MICTURITION: ICD-10-CM

## 2024-02-15 DIAGNOSIS — R31.9 HEMATURIA, UNSPECIFIED: ICD-10-CM

## 2024-02-15 PROCEDURE — 99213 OFFICE O/P EST LOW 20 MIN: CPT | Mod: 25

## 2024-02-15 PROCEDURE — 52000 CYSTOURETHROSCOPY: CPT

## 2024-02-20 PROBLEM — R31.9 HEMATURIA, UNSPECIFIED TYPE: Status: ACTIVE | Noted: 2024-01-31

## 2024-02-20 PROBLEM — N95.2 VAGINAL ATROPHY: Status: ACTIVE | Noted: 2024-01-31

## 2024-02-20 LAB — URINE CYTOLOGY: NORMAL

## 2024-02-20 NOTE — ASSESSMENT
[FreeTextEntry1] : A cytology was sent for the  and we reviewed fact that now work up is complete and urine is now normal. She simply needs yearly Urine tests with her PCP. If and when new microheme, please forward me a copy and she should call for further follow up.  If any urinary symptoms or changes, patient will return.  She demonstrated understanding and is aware that her cystoscopy is negative.  If patient develops sx's of atrophy such as dysuria or inc urgency or frequency, pain with intercourse, etc., can discuss with her Gyn about starting topical or intra vaginal hormones. This is sometimes used when patients begin having UTI sx's etc.  For now  will hold off as these issues are not present in this patient.

## 2024-02-20 NOTE — HISTORY OF PRESENT ILLNESS
[FreeTextEntry1] : ТАТЬЯНА SILVA is a 59 year old F who presents with GH, though it really sounded like vaginal blood.  My repeat UA when I met her showed no blood but due to her report of GH, a CT urogram was ordered and it was normal on 2/8/24.  Images were reviewed and a copy provided to the patient.

## 2024-02-20 NOTE — LETTER BODY
[Dear  ___] : Dear  [unfilled], [Consult Letter:] : I had the pleasure of evaluating your patient, [unfilled]. [Please see my note below.] : Please see my note below. [Consult Closing:] : Thank you very much for allowing me to participate in the care of this patient.  If you have any questions, please do not hesitate to contact me. [FreeTextEntry1] : Please see my note. I will keep you informed. [FreeTextEntry3] : Sincerely,  Nichelle Zelaya MD Clinical  [facility]

## 2024-02-21 DIAGNOSIS — R89.6 ABNORMAL CYTOLOGICAL FINDINGS IN SPECIMENS FROM OTHER ORGANS, SYSTEMS AND TISSUES: ICD-10-CM

## 2024-02-21 DIAGNOSIS — N95.2 POSTMENOPAUSAL ATROPHIC VAGINITIS: ICD-10-CM

## 2024-02-21 DIAGNOSIS — R31.9 HEMATURIA, UNSPECIFIED: ICD-10-CM

## 2025-04-21 ENCOUNTER — APPOINTMENT (OUTPATIENT)
Dept: ORTHOPEDIC SURGERY | Facility: CLINIC | Age: 60
End: 2025-04-21

## 2025-04-28 ENCOUNTER — APPOINTMENT (OUTPATIENT)
Dept: ORTHOPEDIC SURGERY | Facility: CLINIC | Age: 60
End: 2025-04-28
Payer: MEDICAID

## 2025-04-28 DIAGNOSIS — M25.571 PAIN IN RIGHT ANKLE AND JOINTS OF RIGHT FOOT: ICD-10-CM

## 2025-04-28 DIAGNOSIS — M62.461 CONTRACTURE OF MUSCLE, RIGHT LOWER LEG: ICD-10-CM

## 2025-04-28 DIAGNOSIS — S82.839A OTHER FRACTURE OF UPPER AND LOWER END OF UNSPECIFIED FIBULA, INITIAL ENCOUNTER FOR CLOSED FRACTURE: ICD-10-CM

## 2025-04-28 DIAGNOSIS — M21.41 FLAT FOOT [PES PLANUS] (ACQUIRED), RIGHT FOOT: ICD-10-CM

## 2025-04-28 DIAGNOSIS — S96.911A STRAIN OF UNSPECIFIED MUSCLE AND TENDON AT ANKLE AND FOOT LEVEL, RIGHT FOOT, INITIAL ENCOUNTER: ICD-10-CM

## 2025-04-28 PROBLEM — M25.562 LEFT KNEE PAIN: Status: ACTIVE | Noted: 2025-04-28

## 2025-04-28 PROCEDURE — 73610 X-RAY EXAM OF ANKLE: CPT | Mod: RT

## 2025-04-28 PROCEDURE — 99203 OFFICE O/P NEW LOW 30 MIN: CPT

## 2025-05-09 ENCOUNTER — APPOINTMENT (OUTPATIENT)
Dept: ORTHOPEDIC SURGERY | Facility: CLINIC | Age: 60
End: 2025-05-09
Payer: MEDICAID

## 2025-05-09 DIAGNOSIS — M25.562 PAIN IN LEFT KNEE: ICD-10-CM

## 2025-05-09 DIAGNOSIS — M17.12 UNILATERAL PRIMARY OSTEOARTHRITIS, LEFT KNEE: ICD-10-CM

## 2025-05-09 PROCEDURE — 73562 X-RAY EXAM OF KNEE 3: CPT | Mod: LT

## 2025-05-09 PROCEDURE — 99203 OFFICE O/P NEW LOW 30 MIN: CPT

## 2025-09-15 ENCOUNTER — APPOINTMENT (OUTPATIENT)
Dept: ORTHOPEDIC SURGERY | Facility: CLINIC | Age: 60
End: 2025-09-15
Payer: MEDICAID

## 2025-09-15 DIAGNOSIS — S96.911S STRAIN OF UNSPECIFIED MUSCLE AND TENDON AT ANKLE AND FOOT LEVEL, RIGHT FOOT, SEQUELA: ICD-10-CM

## 2025-09-15 DIAGNOSIS — M24.171 OTHER ARTICULAR CARTILAGE DISORDERS, RIGHT ANKLE: ICD-10-CM

## 2025-09-15 PROCEDURE — 99214 OFFICE O/P EST MOD 30 MIN: CPT
